# Patient Record
(demographics unavailable — no encounter records)

---

## 2024-11-13 NOTE — PLAN
[No] : No [Medication education provided] : Medication education provided. [Rationale for medication choices, possible risks/precautions, benefits, alternative treatment choices, and consequences of non-treatment discussed] : Rationale for medication choices, possible risks/precautions, benefits, alternative treatment choices, and consequences of non-treatment discussed with patient/family/caregiver  [FreeTextEntry5] : Psychoeducation and supportive therapy provided and discussed rationale for recommended med changes. Medications: Continue Lamictal 200 mg/day, Abilify 2 mg/day  Continue Lexapro 10 mg/day and Adderall 10 mg once a day as needed for ADHD Educated patient of importance of remaining abstinent from drugs and alcohol.  Emergency procedures were discussed: pt. educated to call 911 or go to nearest ER for worsening of symptoms/suicidal/homicidal ideation.  individual psychotherapy to learn coping skills -outside-recommend patient seeking another therapist either can call the practice for availability of therapists on her insurance panel or contact insurance directly for resources for therapy. RTC in 3 months or earlier as needed  Patient given opportunity to ask questions and their questions were answered and they expressed understanding and agreement with above plan.  I stop checked today: Reference #: [id number]  Practitioner Count: 2 Pharmacy Count: 1 Current Opioid Prescriptions: 0 Current Benzodiazepine Prescriptions: 0 Current Stimulant Prescriptions: 0   Patient Demographic Information (PDI)     PDI	First Name	Last Name	Birth Date	Gender	Street Address	[facility] Code JENIFFER Zazueta	11/26/1999	Female	[address] [facility]	[address]  Prescription Information PDI	My Rx	Current Rx	Drug Type	Rx Written	Rx Dispensed	Drug	Quantity	Days Supply	Prescriber Name	Prescriber YORDY #	Payment Method	Dispenser A	Y	N	S	06/16/2024	06/20/2024	dextroamp-amphetamin 10 mg tab	30	30	Hilda Gordon	[id number]	Insurance	[facility] [id number] A	N	N	S	05/06/2024	05/07/2024	dextroamp-amphetamin 10 mg tab	30	30	Shaggy Anderson MD	[id number]	Insurance	[facility] [id number] A	Y	N	S	03/26/2024	04/05/2024	dextroamp-amphetamin 10 mg tab	30	30	Hilda Gordon	[id number]	Insurance	[facility] [id number] A	Y	N	S	01/29/2024	02/08/2024	dextroamp-amphetamin 10 mg tab	30	30	Hilda Gordon	[id number]	Insurance	[facility] [id number] A	Y	N	S	12/19/2023	12/22/2023	dextroamp-amphetamin 10 mg tab	30	30	Hilda Gordon	[id number]	Insurance	[facility] [id number] A	N	N	S	08/18/2023	09/11/2023	dextroamp-amphetamin 10 mg tab	30	30	Yin Harrison (DO)	[id number]	Insurance	[id number] A	N	N	S	07/20/2023	07/27/2023	dextroamp-amphetamin 10 mg tab	30	30	Yin Harrison (DO)	[id number]	Insurance	[id number]

## 2024-11-13 NOTE — REASON FOR VISIT
[Patient preference] : as per patient preference [Telehealth (audio & video) - Individual/Group] : This visit was provided via telehealth using real-time 2-way audio visual technology. [Medical Office: ([facility])___] : The provider was located at the medical office in [unfilled]. [Other Location: e.g. Home (Enter Location, City,State)___] : The patient, [unfilled], was located at [unfilled] at the time of the visit. [Patient's space is appropriate for telehealth and maintains privacy/confidentiality.] : Patient's space is appropriate for telehealth and maintains privacy/confidentiality. [Verbal consent obtained from patient/other participant(s)] : Verbal consent for telehealth/telephonic services obtained from patient/other participant(s) [Patient] : Patient

## 2024-11-13 NOTE — DISCUSSION/SUMMARY
[FreeTextEntry1] : The patient is a 22 yo woman reports hx of low grad depression with episodes of major depression, anxiety and intense emotional dysregulation, and anxiety in the context of interpersonal relationship stress, primary with romantic partners, loss of impulse control when she is intoxicated with alcohol, and and most recent worsening of depression and anxiety in the context of relationship conflict with BF, leading to ending of their relationship 3 weeks ago. She was started on Lexapro 10 mg/day by her PCP 7 days ago, reports depression and anxiety sx improving from 8/10 to 6/10, 10 being worse. She reports she continues to have trouble falling and staying asleep and that further impact her functioning next day.   09/1/2021: Patient reports partial improvement of symptoms of depression, still has middle insomnia but improving sleep overall with the trazodone. Patient has stopped seeing the therapist because it was only short crisis intervention therapy and she needs to find a new therapist.  09/14/2021: Patient reports she is tolerating the increase in Lexapro dose and trazodone is helping her with sleep and she feels that the improvement in mood she had with the Lexapro was maintained but there has been not much incremental improvement after the last dose increase that it was only a week ago.  11/17/2021: Patient reports she is doing well with good control of anxiety and depression sx, continues ton have trouble managing her ETOH use and it's effect on her mood and behavior and interpersonal relationship stress  1/21/2022: Patient was fired from job, but handling stress and denies sx of depression and states she was able to control her alcohol use. States she gained weight in past 2 months, sedentary life, eating "mindless" during holiday season could be contributing factors.   10/5/2022: The patient was last seen for medication management in January 2022 and she reported that since then she felt better and did not think that she needed medication, went off both Lexapro and trazodone but she started to experience increasing irritability and mood isstability and that includes the symptoms of depression and anxiety. Patient reported that she went back to taking the Lexapro 15 mg about a month ago and noticed that it has made a significant improvement in her mood and irritability and she is able to perform better at her work.   1/09/2023: Patient reported that she is feeling better going back on Lexapro and stated symptoms of depression and anxiety at 4/10, 10 being the worst.  Patient reported that she still has trouble staying focused on a task and organized and also feels that she can get irritable easily because of the pressure with work and having difficulty managing her irritability and impulsivity sometimes.  Patient reported that she had lowered the Adderall dose because she was having palpitations and at lower doses she is still taking it only as needed and does not feel that they are effective.  Discussed with the patient that we could consider another stimulant such as Vyvanse to help with the ADHD symptoms and advised patient to have her primary care physician send her recent physical and EKG report.  4/14/2023: Patient reported mild to moderate symptoms and increased mood reactivity and instability when under influence and poor control of ADHD with low Adderall dose.   10/13/2023: The patient seen today after 6 months, reports an episode end of August when she had severe agitation at a social gathering after she had a few drinks and was taken to ER and discharged after overnight. She reports today symptoms consistent with hypomania, and mixed episodes though not had severe prolonged periods of major depression since Lexapro was started. Based on this information, diagnosis to bipolar disorder, type 2 and lower Lexapro dose and add mood stabilizer.   11/7/2023: The patient reported no adverse effects from lamotrigine, still sleeping excessively, Lexapro dose reduced to 10 mg no adverse effects from reduction in the dosage feels that the Adderall is effective but not consistently and when she took an extended release she was experiencing increased heart rate.  Lamotrigine is still early stages of titration and would advise patient continuing taking the same dose of Adderall and we can reassess the switch to a different stimulant if needed and continue with the lamotrigine dose titration and hold the Lexapro with the plan to further taper and discontinue in the short-term.  12/19/2023: Patient reported relatively mood stability without any severe episodes of major depression and or hypomania since beginning lamotrigine and lowering the Lexapro she states helps reduce her appetite a little, she however continues to experience anger outbursts especially triggered when she hears no or when feels rejected.  Patient feels that the ADHD medications that she is currently on are helping and would like to continue the same.  Given patient is reporting anger outbursts and irritability increasing the lamotrigine dose for increased therapeutic benefit first and defer considering adding second-generation neuroleptic agents to augment lamotrigine because of metabolic risk and other motor long-term side effects.  1/29/2024: Patient reported no episodes of major depression and or hypomania, but increased anxiety and excessive worrying after stopping Lexapro, and an episodes of anger outbursts, after drinking 2 spritzers. Patient felt Lexapro helped with anxiety, and would like to go back, however if she was having increased mood swings, may consider second generation neuroleptic to augment Lamictal and for pharmacological management of irritability and anger   3/26/2024: Patient reported she continues to have symptoms of depression on several days since last visit some days worse than the others and reported no hypomania symptoms since last visit.  6/11/2024: The patient reported improved mood stability, not depressed and no hypomania/mike but still very reactive and quick to get angry and irritated and has difficulty controlling it and may benefit from second generation neuroleptic to augment Lamictal and for pharmacological management of irritability and anger.   7/26/2024: The patient reported much improved mood stability, and reduced irritability and anger since last med changes. She reported feeling "unemotional" and "no sex drive", and sweating a little bit more, states this is even before Abilify, more likely from Lexapro. Patient states she does feel more stable with this med combination and when we tried to lower Lexapro, she felt more anxious, she agrees to continue same meds. She states she prefers to see therapist in person and could not find anyone who is seeing patients in office.

## 2024-11-13 NOTE — HISTORY OF PRESENT ILLNESS
[FreeTextEntry1] :  Med changes on last visit: Increase Lamictal dose to 200 mg daily and add Abilify 2 mg/day  Patient reports increased dose of Lamictal helped, and she is feeling better, with good mood stability.  Patient denied symptoms consistent with major depressive episode, hypomania since last visit.   She reported much improved irritability and not anger outbursts.  Patient states that that she is taking Adderall with good effect on her ADHD symptoms.   Patient denied feeling hopeless or helpless.  She also denied passive or active SI.  She denied symptoms consistent with psychosis.  She states she is in relationship with her BF for one year.  She states she did not use cannabis since last visit.  LMP: regular, on BC pills.  Patient states work is stressful, she is working hard to get a promotion, and "stress makes me snippy".  She reported she is feeling "unemotional" and "no sex drive", and sweating a little bit more, states this is even before Abilify.

## 2025-03-11 NOTE — REASON FOR VISIT
[Patient preference] : as per patient preference [Telehealth (audio & video) - Individual/Group] : This visit was provided via telehealth using real-time 2-way audio visual technology. [Medical Office: ([facility])___] : The provider was located at the medical office in [unfilled]. [Other Location: e.g. Home (Enter Location, City,State)___] : The patient, [unfilled], was located at [unfilled] at the time of the visit. [Patient's space is appropriate for telehealth and maintains privacy/confidentiality.] : Patient's space is appropriate for telehealth and maintains privacy/confidentiality. [Verbal consent obtained from patient/other participant(s)] : Verbal consent for telehealth/telephonic services obtained from patient/other participant(s) [Patient] : Patient [Participant(s) identity verified] : Participant(s) identity verified.

## 2025-03-11 NOTE — REASON FOR VISIT
[Patient preference] : as per patient preference [Telehealth (audio & video) - Individual/Group] : This visit was provided via telehealth using real-time 2-way audio visual technology. [Medical Office: ([facility])___] : The provider was located at the medical office in [unfilled]. [Other Location: e.g. Home (Enter Location, City,State)___] : The patient, [unfilled], was located at [unfilled] at the time of the visit. [Patient's space is appropriate for telehealth and maintains privacy/confidentiality.] : Patient's space is appropriate for telehealth and maintains privacy/confidentiality. [Verbal consent obtained from patient/other participant(s)] : Verbal consent for telehealth/telephonic services obtained from patient/other participant(s) [Patient] : Patient [Participant(s) identity verified] : Participant(s) identity verified.

## 2025-03-19 NOTE — PLAN
[No] : No [Medication education provided] : Medication education provided. [Rationale for medication choices, possible risks/precautions, benefits, alternative treatment choices, and consequences of non-treatment discussed] : Rationale for medication choices, possible risks/precautions, benefits, alternative treatment choices, and consequences of non-treatment discussed with patient/family/caregiver  [FreeTextEntry5] : Psychoeducation provided: Discussed the importance of routine and its positive impact on mental health. Emphasized the benefits of continued physical activity and maintaining a healthy lifestyle. Supportive therapy: Provided positive reinforcement for patient's efforts in maintaining stability and making healthy lifestyle changes, continuation of daily walks and regular exercise routine. Reviewed the positive impact of reduced alcohol consumption and increased physical activity. Medications: Continue Lamictal 200 mg/day, Abilify 2 mg/day  Continue Lexapro 10 mg/day and Adderall 10 mg once a day as needed for ADHD Lab/other tests ordered: Requested patient to schedule a physical examination including an EKG, and to have results sent to our office. Educated patient of importance of remaining abstinent from drugs and alcohol.  Emergency procedures were discussed: pt. educated to call 911 or go to nearest ER for worsening of symptoms/suicidal/homicidal ideation.  individual psychotherapy to learn coping skills -outside-recommend patient seeking another therapist either can call the practice for availability of therapists on her insurance panel or contact insurance directly for resources for therapy. RTC in 2.5 - 3 months or earlier as needed  Patient given opportunity to ask questions and their questions were answered and they expressed understanding and agreement with above plan.  I stop checked today: Reference #: [id number]  Practitioner Count: 1 Pharmacy Count: 1 Current Opioid Prescriptions: 0 Current Benzodiazepine Prescriptions: 0 Current Stimulant Prescriptions: 1   Patient Demographic Information (PDI)     PDI	First Name	Last Name	Birth Date	Gender	Street Address	[facility]	Zip Code A	Radha	Aspinall	11/26/1999	Female	[address]	[address] B	Radha	Aspinall	11/26/1999	Female	[address] [facility]	[address]  Prescription Information    PDI Filter:   PDI	My Rx	Current Rx	Drug Type	Rx Written	Rx Dispensed	Drug	Quantity	Days Supply	Prescriber Name	Prescriber YORDY #	Payment Method	Dispenser A	Y	Y	S	02/13/2025	02/19/2025	dextroamp-amphetamin 10 mg tab	30	30	Hilda Gordon	[id number]	Insurance	[facility] [id number] B	N	N	B	11/05/2024	11/15/2024	alprazolam 0.25 mg tablet	20	20	BrisenoEstiven	[id number]	Insurance	[facility] [id number] B	Y	N	S	11/13/2024	11/15/2024	dextroamp-amphetamin 10 mg tab	30	30	Hilda Gordon	[id number]	Insurance	[facility] [id number] B	Y	N	S	06/16/2024	06/20/2024	dextroamp-amphetamin 10 mg tab	30	30	Hilda Gordon	[id number]	Insurance	[facility] [id number] B	N	N	S	05/06/2024	05/07/2024	dextroamp-amphetamin 10 mg tab	30	30	Shaggy Anderson MD	[id number]	Insurance	[facility] [id number] B	Y	N	S	03/26/2024	04/05/2024	dextroamp-amphetamin 10 mg tab	30	30	Hilda Gorodn	[id number]	Insurance	[facility] [id number]

## 2025-03-19 NOTE — PLAN
[No] : No [Medication education provided] : Medication education provided. [Rationale for medication choices, possible risks/precautions, benefits, alternative treatment choices, and consequences of non-treatment discussed] : Rationale for medication choices, possible risks/precautions, benefits, alternative treatment choices, and consequences of non-treatment discussed with patient/family/caregiver  [FreeTextEntry5] : Psychoeducation provided: Discussed the importance of routine and its positive impact on mental health. Emphasized the benefits of continued physical activity and maintaining a healthy lifestyle. Supportive therapy: Provided positive reinforcement for patient's efforts in maintaining stability and making healthy lifestyle changes, continuation of daily walks and regular exercise routine. Reviewed the positive impact of reduced alcohol consumption and increased physical activity. Medications: Continue Lamictal 200 mg/day, Abilify 2 mg/day  Continue Lexapro 10 mg/day and Adderall 10 mg once a day as needed for ADHD Lab/other tests ordered: Requested patient to schedule a physical examination including an EKG, and to have results sent to our office. Educated patient of importance of remaining abstinent from drugs and alcohol.  Emergency procedures were discussed: pt. educated to call 911 or go to nearest ER for worsening of symptoms/suicidal/homicidal ideation.  individual psychotherapy to learn coping skills -outside-recommend patient seeking another therapist either can call the practice for availability of therapists on her insurance panel or contact insurance directly for resources for therapy. RTC in 2.5 - 3 months or earlier as needed  Patient given opportunity to ask questions and their questions were answered and they expressed understanding and agreement with above plan.  I stop checked today: Reference #: [id number]  Practitioner Count: 1 Pharmacy Count: 1 Current Opioid Prescriptions: 0 Current Benzodiazepine Prescriptions: 0 Current Stimulant Prescriptions: 1   Patient Demographic Information (PDI)     PDI	First Name	Last Name	Birth Date	Gender	Street Address	[facility]	Zip Code A	Radha	Aspinall	11/26/1999	Female	[address]	[address] B	Radha	Aspinall	11/26/1999	Female	[address] [facility]	[address]  Prescription Information    PDI Filter:   PDI	My Rx	Current Rx	Drug Type	Rx Written	Rx Dispensed	Drug	Quantity	Days Supply	Prescriber Name	Prescriber YORDY #	Payment Method	Dispenser A	Y	Y	S	02/13/2025	02/19/2025	dextroamp-amphetamin 10 mg tab	30	30	Hilda Gordon	[id number]	Insurance	[facility] [id number] B	N	N	B	11/05/2024	11/15/2024	alprazolam 0.25 mg tablet	20	20	BrisenoEstiven	[id number]	Insurance	[facility] [id number] B	Y	N	S	11/13/2024	11/15/2024	dextroamp-amphetamin 10 mg tab	30	30	Hilda Gordon	[id number]	Insurance	[facility] [id number] B	Y	N	S	06/16/2024	06/20/2024	dextroamp-amphetamin 10 mg tab	30	30	Hilda Gordon	[id number]	Insurance	[facility] [id number] B	N	N	S	05/06/2024	05/07/2024	dextroamp-amphetamin 10 mg tab	30	30	Shaggy Anderson MD	[id number]	Insurance	[facility] [id number] B	Y	N	S	03/26/2024	04/05/2024	dextroamp-amphetamin 10 mg tab	30	30	Hilda Gordon	[id number]	Insurance	[facility] [id number]

## 2025-03-19 NOTE — HISTORY OF PRESENT ILLNESS
[FreeTextEntry1] : Patient reports overall sustained improvement in mood and well-being, attributing it to a combination of medication, increased daylight, daily walks, and reduced alcohol consumption. Work is keeping her busy, which she finds beneficial. No significant changes or new stressors reported. She reports feeling better and less in a 'sulky depressive state'. No recent episodes of irritability, anger outbursts, or periods of lows or highs in the past 3-4 months. Not anxious. No mike/hypomania symptoms since last visit.  Sleep and appetite-good No passive/active SI She reports Adderall prn helping with managing ADHD symptoms effectively.  Sustained improved motivation, engaging in regular exercise and maintaining a busy work schedule. Psychotic Symptoms: denied  Side effects from medications: No side effects reported. Adherence to medication: Patient reports consistent adherence to prescribed medications. Substance use history: Patient reports significantly reduced alcohol consumption. No current marijuana use. Medical update: No new physical health issues reported. Patient is working on improving overall health through increased exercise. Menstrual periods:  Patient reports regular menstrual periods. Continues on the same birth control with no changes. Psychosocial history: Work is reported as manageable and keeping the patient busy. Relationship is described as stable. Patient is adapting to a routine, which she finds beneficial despite initial resistance.

## 2025-03-19 NOTE — DISCUSSION/SUMMARY
[FreeTextEntry1] : The patient is a 22 yo woman reports hx of low grad depression with episodes of major depression, anxiety and intense emotional dysregulation, and anxiety in the context of interpersonal relationship stress, primary with romantic partners, loss of impulse control when she is intoxicated with alcohol, and and most recent worsening of depression and anxiety in the context of relationship conflict with BF, leading to ending of their relationship 3 weeks ago. She was started on Lexapro 10 mg/day by her PCP 7 days ago, reports depression and anxiety sx improving from 8/10 to 6/10, 10 being worse. She reports she continues to have trouble falling and staying asleep and that further impact her functioning next day.   09/1/2021: Patient reports partial improvement of symptoms of depression, still has middle insomnia but improving sleep overall with the trazodone. Patient has stopped seeing the therapist because it was only short crisis intervention therapy and she needs to find a new therapist.  09/14/2021: Patient reports she is tolerating the increase in Lexapro dose and trazodone is helping her with sleep and she feels that the improvement in mood she had with the Lexapro was maintained but there has been not much incremental improvement after the last dose increase that it was only a week ago.  11/17/2021: Patient reports she is doing well with good control of anxiety and depression sx, continues ton have trouble managing her ETOH use and it's effect on her mood and behavior and interpersonal relationship stress  1/21/2022: Patient was fired from job, but handling stress and denies sx of depression and states she was able to control her alcohol use. States she gained weight in past 2 months, sedentary life, eating "mindless" during holiday season could be contributing factors.   10/5/2022: The patient was last seen for medication management in January 2022 and she reported that since then she felt better and did not think that she needed medication, went off both Lexapro and trazodone but she started to experience increasing irritability and mood isstability and that includes the symptoms of depression and anxiety. Patient reported that she went back to taking the Lexapro 15 mg about a month ago and noticed that it has made a significant improvement in her mood and irritability and she is able to perform better at her work.   1/09/2023: Patient reported that she is feeling better going back on Lexapro and stated symptoms of depression and anxiety at 4/10, 10 being the worst.  Patient reported that she still has trouble staying focused on a task and organized and also feels that she can get irritable easily because of the pressure with work and having difficulty managing her irritability and impulsivity sometimes.  Patient reported that she had lowered the Adderall dose because she was having palpitations and at lower doses she is still taking it only as needed and does not feel that they are effective.  Discussed with the patient that we could consider another stimulant such as Vyvanse to help with the ADHD symptoms and advised patient to have her primary care physician send her recent physical and EKG report.  4/14/2023: Patient reported mild to moderate symptoms and increased mood reactivity and instability when under influence and poor control of ADHD with low Adderall dose.   10/13/2023: The patient seen today after 6 months, reports an episode end of August when she had severe agitation at a social gathering after she had a few drinks and was taken to ER and discharged after overnight. She reports today symptoms consistent with hypomania, and mixed episodes though not had severe prolonged periods of major depression since Lexapro was started. Based on this information, diagnosis to bipolar disorder, type 2 and lower Lexapro dose and add mood stabilizer.   11/7/2023: The patient reported no adverse effects from lamotrigine, still sleeping excessively, Lexapro dose reduced to 10 mg no adverse effects from reduction in the dosage feels that the Adderall is effective but not consistently and when she took an extended release she was experiencing increased heart rate.  Lamotrigine is still early stages of titration and would advise patient continuing taking the same dose of Adderall and we can reassess the switch to a different stimulant if needed and continue with the lamotrigine dose titration and hold the Lexapro with the plan to further taper and discontinue in the short-term.  12/19/2023: Patient reported relatively mood stability without any severe episodes of major depression and or hypomania since beginning lamotrigine and lowering the Lexapro she states helps reduce her appetite a little, she however continues to experience anger outbursts especially triggered when she hears no or when feels rejected.  Patient feels that the ADHD medications that she is currently on are helping and would like to continue the same.  Given patient is reporting anger outbursts and irritability increasing the lamotrigine dose for increased therapeutic benefit first and defer considering adding second-generation neuroleptic agents to augment lamotrigine because of metabolic risk and other motor long-term side effects.  1/29/2024: Patient reported no episodes of major depression and or hypomania, but increased anxiety and excessive worrying after stopping Lexapro, and an episodes of anger outbursts, after drinking 2 spritzers. Patient felt Lexapro helped with anxiety, and would like to go back, however if she was having increased mood swings, may consider second generation neuroleptic to augment Lamictal and for pharmacological management of irritability and anger   3/26/2024: Patient reported she continues to have symptoms of depression on several days since last visit some days worse than the others and reported no hypomania symptoms since last visit.  6/11/2024: The patient reported improved mood stability, not depressed and no hypomania/mike but still very reactive and quick to get angry and irritated and has difficulty controlling it and may benefit from second generation neuroleptic to augment Lamictal and for pharmacological management of irritability and anger.   7/26/2024: The patient reported much improved mood stability, and reduced irritability and anger since last med changes. She reported feeling "unemotional" and "no sex drive", and sweating a little bit more, states this is even before Abilify, more likely from Lexapro. Patient states she does feel more stable with this med combination and when we tried to lower Lexapro, she felt more anxious, she agrees to continue same meds. She states she prefers to see therapist in person and could not find anyone who is seeing patients in office.  11/13/2024: The patient reported she is overall doing well with good control of mood episodes and ADHD symptoms and denied excess ETOH use since last visit.  3/11/2025: Patient demonstrates overall sustained improvement in mood stability and general well-being. Combination of medication, lifestyle changes (reduced alcohol consumption, increased physical activity), and environmental factors (increased daylight) appear to be contributing to her stability. Current medication regimen, including as-needed use of Adderall, seems effective. No significant concerns or changes reported.

## 2025-03-19 NOTE — DISCUSSION/SUMMARY
[FreeTextEntry1] : The patient is a 20 yo woman reports hx of low grad depression with episodes of major depression, anxiety and intense emotional dysregulation, and anxiety in the context of interpersonal relationship stress, primary with romantic partners, loss of impulse control when she is intoxicated with alcohol, and and most recent worsening of depression and anxiety in the context of relationship conflict with BF, leading to ending of their relationship 3 weeks ago. She was started on Lexapro 10 mg/day by her PCP 7 days ago, reports depression and anxiety sx improving from 8/10 to 6/10, 10 being worse. She reports she continues to have trouble falling and staying asleep and that further impact her functioning next day.   09/1/2021: Patient reports partial improvement of symptoms of depression, still has middle insomnia but improving sleep overall with the trazodone. Patient has stopped seeing the therapist because it was only short crisis intervention therapy and she needs to find a new therapist.  09/14/2021: Patient reports she is tolerating the increase in Lexapro dose and trazodone is helping her with sleep and she feels that the improvement in mood she had with the Lexapro was maintained but there has been not much incremental improvement after the last dose increase that it was only a week ago.  11/17/2021: Patient reports she is doing well with good control of anxiety and depression sx, continues ton have trouble managing her ETOH use and it's effect on her mood and behavior and interpersonal relationship stress  1/21/2022: Patient was fired from job, but handling stress and denies sx of depression and states she was able to control her alcohol use. States she gained weight in past 2 months, sedentary life, eating "mindless" during holiday season could be contributing factors.   10/5/2022: The patient was last seen for medication management in January 2022 and she reported that since then she felt better and did not think that she needed medication, went off both Lexapro and trazodone but she started to experience increasing irritability and mood isstability and that includes the symptoms of depression and anxiety. Patient reported that she went back to taking the Lexapro 15 mg about a month ago and noticed that it has made a significant improvement in her mood and irritability and she is able to perform better at her work.   1/09/2023: Patient reported that she is feeling better going back on Lexapro and stated symptoms of depression and anxiety at 4/10, 10 being the worst.  Patient reported that she still has trouble staying focused on a task and organized and also feels that she can get irritable easily because of the pressure with work and having difficulty managing her irritability and impulsivity sometimes.  Patient reported that she had lowered the Adderall dose because she was having palpitations and at lower doses she is still taking it only as needed and does not feel that they are effective.  Discussed with the patient that we could consider another stimulant such as Vyvanse to help with the ADHD symptoms and advised patient to have her primary care physician send her recent physical and EKG report.  4/14/2023: Patient reported mild to moderate symptoms and increased mood reactivity and instability when under influence and poor control of ADHD with low Adderall dose.   10/13/2023: The patient seen today after 6 months, reports an episode end of August when she had severe agitation at a social gathering after she had a few drinks and was taken to ER and discharged after overnight. She reports today symptoms consistent with hypomania, and mixed episodes though not had severe prolonged periods of major depression since Lexapro was started. Based on this information, diagnosis to bipolar disorder, type 2 and lower Lexapro dose and add mood stabilizer.   11/7/2023: The patient reported no adverse effects from lamotrigine, still sleeping excessively, Lexapro dose reduced to 10 mg no adverse effects from reduction in the dosage feels that the Adderall is effective but not consistently and when she took an extended release she was experiencing increased heart rate.  Lamotrigine is still early stages of titration and would advise patient continuing taking the same dose of Adderall and we can reassess the switch to a different stimulant if needed and continue with the lamotrigine dose titration and hold the Lexapro with the plan to further taper and discontinue in the short-term.  12/19/2023: Patient reported relatively mood stability without any severe episodes of major depression and or hypomania since beginning lamotrigine and lowering the Lexapro she states helps reduce her appetite a little, she however continues to experience anger outbursts especially triggered when she hears no or when feels rejected.  Patient feels that the ADHD medications that she is currently on are helping and would like to continue the same.  Given patient is reporting anger outbursts and irritability increasing the lamotrigine dose for increased therapeutic benefit first and defer considering adding second-generation neuroleptic agents to augment lamotrigine because of metabolic risk and other motor long-term side effects.  1/29/2024: Patient reported no episodes of major depression and or hypomania, but increased anxiety and excessive worrying after stopping Lexapro, and an episodes of anger outbursts, after drinking 2 spritzers. Patient felt Lexapro helped with anxiety, and would like to go back, however if she was having increased mood swings, may consider second generation neuroleptic to augment Lamictal and for pharmacological management of irritability and anger   3/26/2024: Patient reported she continues to have symptoms of depression on several days since last visit some days worse than the others and reported no hypomania symptoms since last visit.  6/11/2024: The patient reported improved mood stability, not depressed and no hypomania/mike but still very reactive and quick to get angry and irritated and has difficulty controlling it and may benefit from second generation neuroleptic to augment Lamictal and for pharmacological management of irritability and anger.   7/26/2024: The patient reported much improved mood stability, and reduced irritability and anger since last med changes. She reported feeling "unemotional" and "no sex drive", and sweating a little bit more, states this is even before Abilify, more likely from Lexapro. Patient states she does feel more stable with this med combination and when we tried to lower Lexapro, she felt more anxious, she agrees to continue same meds. She states she prefers to see therapist in person and could not find anyone who is seeing patients in office.  11/13/2024: The patient reported she is overall doing well with good control of mood episodes and ADHD symptoms and denied excess ETOH use since last visit.  3/11/2025: Patient demonstrates overall sustained improvement in mood stability and general well-being. Combination of medication, lifestyle changes (reduced alcohol consumption, increased physical activity), and environmental factors (increased daylight) appear to be contributing to her stability. Current medication regimen, including as-needed use of Adderall, seems effective. No significant concerns or changes reported.

## 2025-05-22 NOTE — REASON FOR VISIT
[Patient preference] : as per patient preference [Telehealth (audio & video) - Individual/Group] : This visit was provided via telehealth using real-time 2-way audio visual technology. [Medical Office: ([facility])___] : The provider was located at the medical office in [unfilled]. [Other Location: e.g. Home (Enter Location, City,State)___] : The patient, [unfilled], was located at [unfilled] at the time of the visit. [Patient's space is appropriate for telehealth and maintains privacy/confidentiality.] : Patient's space is appropriate for telehealth and maintains privacy/confidentiality. [Participant(s) identity verified] : Participant(s) identity verified. [Verbal consent obtained from patient/other participant(s)] : Verbal consent for telehealth/telephonic services obtained from patient/other participant(s) [Patient] : Patient

## 2025-05-22 NOTE — REASON FOR VISIT
[Patient preference] : as per patient preference [Telehealth (audio & video) - Individual/Group] : This visit was provided via telehealth using real-time 2-way audio visual technology. [Medical Office: ([facility])___] : The provider was located at the medical office in [unfilled]. [Other Location: e.g. Home (Enter Location, City,State)___] : The patient, [unfilled], was located at [unfilled] at the time of the visit. [Patient's space is appropriate for telehealth and maintains privacy/confidentiality.] : Patient's space is appropriate for telehealth and maintains privacy/confidentiality. [Participant(s) identity verified] : Participant(s) identity verified. [Verbal consent obtained from patient/other participant(s)] : Verbal consent for telehealth/telephonic services obtained from patient/other participant(s) [Patient] : Patient

## 2025-05-23 NOTE — PLAN
[No] : No [Medication education provided] : Medication education provided. [Rationale for medication choices, possible risks/precautions, benefits, alternative treatment choices, and consequences of non-treatment discussed] : Rationale for medication choices, possible risks/precautions, benefits, alternative treatment choices, and consequences of non-treatment discussed with patient/family/caregiver  [FreeTextEntry5] : Psychoeducation provided: Discussed the importance of routine and its positive impact on mental health. Emphasized the benefits of continued physical activity and maintaining a healthy lifestyle. Supportive therapy: Provided positive reinforcement for patient's efforts in maintaining stability and making healthy lifestyle changes, continuation of daily walks and regular exercise routine. Reviewed the positive impact of reduced alcohol consumption and increased physical activity. Medications: Continue Lamictal 200 mg/day, Abilify 2 mg/day  Continue Lexapro 10 mg/day and Adderall 10 mg once a day as needed for ADHD Lab/other tests ordered: Requested patient to schedule a physical examination including an EKG, and to have results sent to our office. Educated patient of importance of remaining abstinent from drugs and alcohol.  Emergency procedures were discussed: pt. educated to call 911 or go to nearest ER for worsening of symptoms/suicidal/homicidal ideation.  individual psychotherapy to learn coping skills -outside-recommend patient seeking another therapist either can call the practice for availability of therapists on her insurance panel or contact insurance directly for resources for therapy. RTC in 2.5 - 3 months or earlier as needed  Patient given opportunity to ask questions and their questions were answered and they expressed understanding and agreement with above plan.  I stop checked today: Reference #: [id number]  Practitioner Count: 0 Pharmacy Count: 0 Current Opioid Prescriptions: 0 Current Benzodiazepine Prescriptions: 0 Current Stimulant Prescriptions: 0   Patient Demographic Information (PDI)     PDI	First Name	Last Name	Birth Date	Gender	Street Address	[facility]	Zip Code A	Radha	Aspinall	11/26/1999	Female	[address]	[address] [address]	Radha	Aspinall	11/26/1999	Female	[address] [facility]	[address]  Prescription Information    PDI Filter:   PDI	My Rx	Current Rx	Drug Type	Rx Written	Rx Dispensed	Drug	Quantity	Days Supply	Prescriber Name	Prescriber YORDY #	Payment Method	Dispenser A	Y	N	S	02/13/2025	02/19/2025	dextroamp-amphetamin 10 mg tab	30	30	Hilda Gordon	[id number]	Insurance	[facility] [id number] [address]	N	N	B	11/05/2024	11/15/2024	alprazolam 0.25 mg tablet	20	20	BrisenoEstiven	[id number]	Insurance	[facility] [id number] [address]	Y	N	S	11/13/2024	11/15/2024	dextroamp-amphetamin 10 mg tab	30	30	Hilda Gordon	[id number]	Insurance	[facility] [id number] [address]	Y	N	S	06/16/2024	06/20/2024	dextroamp-amphetamin 10 mg tab	30	30	Hilda Gordon	[id number]	Insurance	[facility] [id number]

## 2025-05-23 NOTE — DISCUSSION/SUMMARY
[FreeTextEntry1] : The patient is a 22 yo woman reports hx of low grad depression with episodes of major depression, anxiety and intense emotional dysregulation, and anxiety in the context of interpersonal relationship stress, primary with romantic partners, loss of impulse control when she is intoxicated with alcohol, and and most recent worsening of depression and anxiety in the context of relationship conflict with BF, leading to ending of their relationship 3 weeks ago. She was started on Lexapro 10 mg/day by her PCP 7 days ago, reports depression and anxiety sx improving from 8/10 to 6/10, 10 being worse. She reports she continues to have trouble falling and staying asleep and that further impact her functioning next day.   09/1/2021: Patient reports partial improvement of symptoms of depression, still has middle insomnia but improving sleep overall with the trazodone. Patient has stopped seeing the therapist because it was only short crisis intervention therapy and she needs to find a new therapist.  09/14/2021: Patient reports she is tolerating the increase in Lexapro dose and trazodone is helping her with sleep and she feels that the improvement in mood she had with the Lexapro was maintained but there has been not much incremental improvement after the last dose increase that it was only a week ago.  11/17/2021: Patient reports she is doing well with good control of anxiety and depression sx, continues ton have trouble managing her ETOH use and it's effect on her mood and behavior and interpersonal relationship stress  1/21/2022: Patient was fired from job, but handling stress and denies sx of depression and states she was able to control her alcohol use. States she gained weight in past 2 months, sedentary life, eating "mindless" during holiday season could be contributing factors.   10/5/2022: The patient was last seen for medication management in January 2022 and she reported that since then she felt better and did not think that she needed medication, went off both Lexapro and trazodone but she started to experience increasing irritability and mood isstability and that includes the symptoms of depression and anxiety. Patient reported that she went back to taking the Lexapro 15 mg about a month ago and noticed that it has made a significant improvement in her mood and irritability and she is able to perform better at her work.   1/09/2023: Patient reported that she is feeling better going back on Lexapro and stated symptoms of depression and anxiety at 4/10, 10 being the worst.  Patient reported that she still has trouble staying focused on a task and organized and also feels that she can get irritable easily because of the pressure with work and having difficulty managing her irritability and impulsivity sometimes.  Patient reported that she had lowered the Adderall dose because she was having palpitations and at lower doses she is still taking it only as needed and does not feel that they are effective.  Discussed with the patient that we could consider another stimulant such as Vyvanse to help with the ADHD symptoms and advised patient to have her primary care physician send her recent physical and EKG report.  4/14/2023: Patient reported mild to moderate symptoms and increased mood reactivity and instability when under influence and poor control of ADHD with low Adderall dose.   10/13/2023: The patient seen today after 6 months, reports an episode end of August when she had severe agitation at a social gathering after she had a few drinks and was taken to ER and discharged after overnight. She reports today symptoms consistent with hypomania, and mixed episodes though not had severe prolonged periods of major depression since Lexapro was started. Based on this information, diagnosis to bipolar disorder, type 2 and lower Lexapro dose and add mood stabilizer.   11/7/2023: The patient reported no adverse effects from lamotrigine, still sleeping excessively, Lexapro dose reduced to 10 mg no adverse effects from reduction in the dosage feels that the Adderall is effective but not consistently and when she took an extended release she was experiencing increased heart rate.  Lamotrigine is still early stages of titration and would advise patient continuing taking the same dose of Adderall and we can reassess the switch to a different stimulant if needed and continue with the lamotrigine dose titration and hold the Lexapro with the plan to further taper and discontinue in the short-term.  12/19/2023: Patient reported relatively mood stability without any severe episodes of major depression and or hypomania since beginning lamotrigine and lowering the Lexapro she states helps reduce her appetite a little, she however continues to experience anger outbursts especially triggered when she hears no or when feels rejected.  Patient feels that the ADHD medications that she is currently on are helping and would like to continue the same.  Given patient is reporting anger outbursts and irritability increasing the lamotrigine dose for increased therapeutic benefit first and defer considering adding second-generation neuroleptic agents to augment lamotrigine because of metabolic risk and other motor long-term side effects.  1/29/2024: Patient reported no episodes of major depression and or hypomania, but increased anxiety and excessive worrying after stopping Lexapro, and an episodes of anger outbursts, after drinking 2 spritzers. Patient felt Lexapro helped with anxiety, and would like to go back, however if she was having increased mood swings, may consider second generation neuroleptic to augment Lamictal and for pharmacological management of irritability and anger   3/26/2024: Patient reported she continues to have symptoms of depression on several days since last visit some days worse than the others and reported no hypomania symptoms since last visit.  6/11/2024: The patient reported improved mood stability, not depressed and no hypomania/mike but still very reactive and quick to get angry and irritated and has difficulty controlling it and may benefit from second generation neuroleptic to augment Lamictal and for pharmacological management of irritability and anger.   7/26/2024: The patient reported much improved mood stability, and reduced irritability and anger since last med changes. She reported feeling "unemotional" and "no sex drive", and sweating a little bit more, states this is even before Abilify, more likely from Lexapro. Patient states she does feel more stable with this med combination and when we tried to lower Lexapro, she felt more anxious, she agrees to continue same meds. She states she prefers to see therapist in person and could not find anyone who is seeing patients in office.  11/13/2024: The patient reported she is overall doing well with good control of mood episodes and ADHD symptoms and denied excess ETOH use since last visit.  3/11/2025: Patient demonstrates overall sustained improvement in mood stability and general well-being. Combination of medication, lifestyle changes (reduced alcohol consumption, increased physical activity), and environmental factors (increased daylight) appear to be contributing to her stability. Current medication regimen, including as-needed use of Adderall, seems effective. No significant concerns or changes reported. 5/23/2025:

## 2025-05-23 NOTE — DISCUSSION/SUMMARY
[FreeTextEntry1] : The patient is a 20 yo woman reports hx of low grad depression with episodes of major depression, anxiety and intense emotional dysregulation, and anxiety in the context of interpersonal relationship stress, primary with romantic partners, loss of impulse control when she is intoxicated with alcohol, and and most recent worsening of depression and anxiety in the context of relationship conflict with BF, leading to ending of their relationship 3 weeks ago. She was started on Lexapro 10 mg/day by her PCP 7 days ago, reports depression and anxiety sx improving from 8/10 to 6/10, 10 being worse. She reports she continues to have trouble falling and staying asleep and that further impact her functioning next day.   09/1/2021: Patient reports partial improvement of symptoms of depression, still has middle insomnia but improving sleep overall with the trazodone. Patient has stopped seeing the therapist because it was only short crisis intervention therapy and she needs to find a new therapist.  09/14/2021: Patient reports she is tolerating the increase in Lexapro dose and trazodone is helping her with sleep and she feels that the improvement in mood she had with the Lexapro was maintained but there has been not much incremental improvement after the last dose increase that it was only a week ago.  11/17/2021: Patient reports she is doing well with good control of anxiety and depression sx, continues ton have trouble managing her ETOH use and it's effect on her mood and behavior and interpersonal relationship stress  1/21/2022: Patient was fired from job, but handling stress and denies sx of depression and states she was able to control her alcohol use. States she gained weight in past 2 months, sedentary life, eating "mindless" during holiday season could be contributing factors.   10/5/2022: The patient was last seen for medication management in January 2022 and she reported that since then she felt better and did not think that she needed medication, went off both Lexapro and trazodone but she started to experience increasing irritability and mood isstability and that includes the symptoms of depression and anxiety. Patient reported that she went back to taking the Lexapro 15 mg about a month ago and noticed that it has made a significant improvement in her mood and irritability and she is able to perform better at her work.   1/09/2023: Patient reported that she is feeling better going back on Lexapro and stated symptoms of depression and anxiety at 4/10, 10 being the worst.  Patient reported that she still has trouble staying focused on a task and organized and also feels that she can get irritable easily because of the pressure with work and having difficulty managing her irritability and impulsivity sometimes.  Patient reported that she had lowered the Adderall dose because she was having palpitations and at lower doses she is still taking it only as needed and does not feel that they are effective.  Discussed with the patient that we could consider another stimulant such as Vyvanse to help with the ADHD symptoms and advised patient to have her primary care physician send her recent physical and EKG report.  4/14/2023: Patient reported mild to moderate symptoms and increased mood reactivity and instability when under influence and poor control of ADHD with low Adderall dose.   10/13/2023: The patient seen today after 6 months, reports an episode end of August when she had severe agitation at a social gathering after she had a few drinks and was taken to ER and discharged after overnight. She reports today symptoms consistent with hypomania, and mixed episodes though not had severe prolonged periods of major depression since Lexapro was started. Based on this information, diagnosis to bipolar disorder, type 2 and lower Lexapro dose and add mood stabilizer.   11/7/2023: The patient reported no adverse effects from lamotrigine, still sleeping excessively, Lexapro dose reduced to 10 mg no adverse effects from reduction in the dosage feels that the Adderall is effective but not consistently and when she took an extended release she was experiencing increased heart rate.  Lamotrigine is still early stages of titration and would advise patient continuing taking the same dose of Adderall and we can reassess the switch to a different stimulant if needed and continue with the lamotrigine dose titration and hold the Lexapro with the plan to further taper and discontinue in the short-term.  12/19/2023: Patient reported relatively mood stability without any severe episodes of major depression and or hypomania since beginning lamotrigine and lowering the Lexapro she states helps reduce her appetite a little, she however continues to experience anger outbursts especially triggered when she hears no or when feels rejected.  Patient feels that the ADHD medications that she is currently on are helping and would like to continue the same.  Given patient is reporting anger outbursts and irritability increasing the lamotrigine dose for increased therapeutic benefit first and defer considering adding second-generation neuroleptic agents to augment lamotrigine because of metabolic risk and other motor long-term side effects.  1/29/2024: Patient reported no episodes of major depression and or hypomania, but increased anxiety and excessive worrying after stopping Lexapro, and an episodes of anger outbursts, after drinking 2 spritzers. Patient felt Lexapro helped with anxiety, and would like to go back, however if she was having increased mood swings, may consider second generation neuroleptic to augment Lamictal and for pharmacological management of irritability and anger   3/26/2024: Patient reported she continues to have symptoms of depression on several days since last visit some days worse than the others and reported no hypomania symptoms since last visit.  6/11/2024: The patient reported improved mood stability, not depressed and no hypomania/mike but still very reactive and quick to get angry and irritated and has difficulty controlling it and may benefit from second generation neuroleptic to augment Lamictal and for pharmacological management of irritability and anger.   7/26/2024: The patient reported much improved mood stability, and reduced irritability and anger since last med changes. She reported feeling "unemotional" and "no sex drive", and sweating a little bit more, states this is even before Abilify, more likely from Lexapro. Patient states she does feel more stable with this med combination and when we tried to lower Lexapro, she felt more anxious, she agrees to continue same meds. She states she prefers to see therapist in person and could not find anyone who is seeing patients in office.  11/13/2024: The patient reported she is overall doing well with good control of mood episodes and ADHD symptoms and denied excess ETOH use since last visit.  3/11/2025: Patient demonstrates overall sustained improvement in mood stability and general well-being. Combination of medication, lifestyle changes (reduced alcohol consumption, increased physical activity), and environmental factors (increased daylight) appear to be contributing to her stability. Current medication regimen, including as-needed use of Adderall, seems effective. No significant concerns or changes reported. 5/23/2025:

## 2025-05-23 NOTE — PLAN
[No] : No [Medication education provided] : Medication education provided. [Rationale for medication choices, possible risks/precautions, benefits, alternative treatment choices, and consequences of non-treatment discussed] : Rationale for medication choices, possible risks/precautions, benefits, alternative treatment choices, and consequences of non-treatment discussed with patient/family/caregiver  [FreeTextEntry5] : Psychoeducation provided: Discussed the importance of routine and its positive impact on mental health. Emphasized the benefits of continued physical activity and maintaining a healthy lifestyle. Supportive therapy: Provided positive reinforcement for patient's efforts in maintaining stability and making healthy lifestyle changes, continuation of daily walks and regular exercise routine. Reviewed the positive impact of reduced alcohol consumption and increased physical activity. Medications: Continue Lamictal 200 mg/day, Abilify 2 mg/day  Continue Lexapro 10 mg/day and Adderall 10 mg once a day as needed for ADHD Lab/other tests ordered: Requested patient to schedule a physical examination including an EKG, and to have results sent to our office. Educated patient of importance of remaining abstinent from drugs and alcohol.  Emergency procedures were discussed: pt. educated to call 911 or go to nearest ER for worsening of symptoms/suicidal/homicidal ideation.  individual psychotherapy to learn coping skills -outside-recommend patient seeking another therapist either can call the practice for availability of therapists on her insurance panel or contact insurance directly for resources for therapy. RTC in 2.5 - 3 months or earlier as needed  Patient given opportunity to ask questions and their questions were answered and they expressed understanding and agreement with above plan.  I stop checked today: Reference #: [id number]  Practitioner Count: 0 Pharmacy Count: 0 Current Opioid Prescriptions: 0 Current Benzodiazepine Prescriptions: 0 Current Stimulant Prescriptions: 0   Patient Demographic Information (PDI)     PDI	First Name	Last Name	Birth Date	Gender	Street Address	[facility]	Zip Code A	Radha	Aspinall	11/26/1999	Female	[address]	[address] [address]	Radha	Aspinall	11/26/1999	Female	[address] [facility] [facility]	[id number]  Prescription Information    PDI Filter:   PDI	My Rx	Current Rx	Drug Type	Rx Written	Rx Dispensed	Drug	Quantity	Days Supply	Prescriber Name	Prescriber YORDY #	Payment Method	Dispenser A	Y	N	S	02/13/2025	02/19/2025	dextroamp-amphetamin 10 mg tab	30	30	Hilda Gordon	[id number]	Insurance	[facility] [id number] [address]	N	N	B	11/05/2024	11/15/2024	alprazolam 0.25 mg tablet	20	20	BrisenoEstiven	[id number]	Insurance	[facility] [id number] [address]	Y	N	S	11/13/2024	11/15/2024	dextroamp-amphetamin 10 mg tab	30	30	Hilda Gordon	[id number]	Insurance	[facility] [id number] [address]	Y	N	S	06/16/2024	06/20/2024	dextroamp-amphetamin 10 mg tab	30	30	Hilda Gordon	[id number]	Insurance	[facility] [id number]